# Patient Record
Sex: FEMALE | Race: WHITE | NOT HISPANIC OR LATINO | URBAN - METROPOLITAN AREA
[De-identification: names, ages, dates, MRNs, and addresses within clinical notes are randomized per-mention and may not be internally consistent; named-entity substitution may affect disease eponyms.]

---

## 2022-06-04 ENCOUNTER — TELEPHONE ENCOUNTER (OUTPATIENT)
Dept: URBAN - METROPOLITAN AREA CLINIC 68 | Facility: CLINIC | Age: 62
End: 2022-06-04

## 2022-06-05 ENCOUNTER — TELEPHONE ENCOUNTER (OUTPATIENT)
Dept: URBAN - METROPOLITAN AREA CLINIC 68 | Facility: CLINIC | Age: 62
End: 2022-06-05

## 2022-06-05 RX ORDER — ESOMEPRAZOLE MAGNESIUM 40 MG
CAPSULE,DELAYED RELEASE (ENTERIC COATED) ORAL DAILY
Qty: 30 | Refills: 30 | Status: ACTIVE | COMMUNITY
Start: 2012-09-06

## 2022-06-05 RX ORDER — HYDROXYCHLOROQUINE SULFATE 200 MG/1
PLAQUENIL( 200MG ORAL 40 MG DAILY ) ACTIVE -HX ENTRY TABLET ORAL DAILY
Status: ACTIVE | COMMUNITY
Start: 2012-03-13

## 2022-06-05 RX ORDER — PANTOPRAZOLE SODIUM 40 MG/1
TABLET, DELAYED RELEASE ORAL DAILY
Qty: 90 | Refills: 0 | Status: ACTIVE | COMMUNITY
Start: 2014-02-06

## 2022-06-25 ENCOUNTER — TELEPHONE ENCOUNTER (OUTPATIENT)
Age: 62
End: 2022-06-25

## 2022-06-26 ENCOUNTER — TELEPHONE ENCOUNTER (OUTPATIENT)
Age: 62
End: 2022-06-26

## 2022-06-26 RX ORDER — HYDROXYCHLOROQUINE SULFATE 200 MG/1
PLAQUENIL( 200MG ORAL 40 MG DAILY ) ACTIVE -HX ENTRY TABLET ORAL DAILY
Status: ACTIVE | COMMUNITY
Start: 2012-03-13

## 2022-06-26 RX ORDER — PANTOPRAZOLE 40 MG/1
TABLET, DELAYED RELEASE ORAL DAILY
Qty: 90 | Refills: 0 | Status: ACTIVE | COMMUNITY
Start: 2014-02-06

## 2022-06-26 RX ORDER — ESOMEPRAZOLE MAGNESIUM 40 MG
CAPSULE,DELAYED RELEASE (ENTERIC COATED) ORAL DAILY
Qty: 30 | Refills: 30 | Status: ACTIVE | COMMUNITY
Start: 2012-09-06

## 2024-03-25 ENCOUNTER — NEW PATIENT (OUTPATIENT)
Dept: URBAN - METROPOLITAN AREA CLINIC 53 | Facility: CLINIC | Age: 64
End: 2024-03-25

## 2024-03-25 DIAGNOSIS — Z79.899: ICD-10-CM

## 2024-03-25 DIAGNOSIS — H52.4: ICD-10-CM

## 2024-03-25 DIAGNOSIS — H25.813: ICD-10-CM

## 2024-03-25 DIAGNOSIS — H18.012: ICD-10-CM

## 2024-03-25 DIAGNOSIS — H35.363: ICD-10-CM

## 2024-03-25 PROCEDURE — 92134 CPTRZ OPH DX IMG PST SGM RTA: CPT

## 2024-03-25 PROCEDURE — 92015 DETERMINE REFRACTIVE STATE: CPT

## 2024-03-25 PROCEDURE — 99204 OFFICE O/P NEW MOD 45 MIN: CPT

## 2024-03-25 ASSESSMENT — VISUAL ACUITY
OS_PH: 20/50+2
OU_CC: J1
OD_GLARE: 20/60
OD_SC: 20/400
OS_GLARE: 20/70
OS_CC: 20/200
OD_GLARE: 20/80
OS_GLARE: 20/100
OD_PH: 20/40
OD_CC: 20/80
OS_SC: 20/400

## 2024-03-25 ASSESSMENT — KERATOMETRY
OS_K1POWER_DIOPTERS: 43.25
OD_K2POWER_DIOPTERS: 44.50
OS_K2POWER_DIOPTERS: 44.25
OD_AXISANGLE2_DEGREES: 71
OS_AXISANGLE_DEGREES: 5
OD_K1POWER_DIOPTERS: 43.75
OD_AXISANGLE_DEGREES: 161
OS_AXISANGLE2_DEGREES: 95

## 2024-03-25 ASSESSMENT — TONOMETRY
OD_IOP_MMHG: 19
OS_IOP_MMHG: 20

## 2024-07-15 ENCOUNTER — PRE-OP/H&P (OUTPATIENT)
Dept: URBAN - METROPOLITAN AREA CLINIC 50 | Facility: LOCATION | Age: 64
End: 2024-07-15

## 2024-07-15 DIAGNOSIS — H35.363: ICD-10-CM

## 2024-07-15 DIAGNOSIS — H25.813: ICD-10-CM

## 2024-07-15 PROCEDURE — 92025-3 CORNEAL TOPO, REFUSED

## 2024-07-15 PROCEDURE — 99214 OFFICE O/P EST MOD 30 MIN: CPT

## 2024-07-15 PROCEDURE — 92136 OPHTHALMIC BIOMETRY: CPT

## 2024-07-15 PROCEDURE — 92134 CPTRZ OPH DX IMG PST SGM RTA: CPT

## 2024-07-15 ASSESSMENT — KERATOMETRY
OS_AXISANGLE2_DEGREES: 090
OD_AXISANGLE2_DEGREES: 078
OS_K2POWER_DIOPTERS: 43.37
OS_K1POWER_DIOPTERS: 44.37
OD_K2POWER_DIOPTERS: 43.62
OS_AXISANGLE_DEGREES: 180
OD_K1POWER_DIOPTERS: 44.37
OD_AXISANGLE_DEGREES: 168

## 2024-07-15 ASSESSMENT — TONOMETRY
OD_IOP_MMHG: 18
OS_IOP_MMHG: 18

## 2024-07-15 ASSESSMENT — VISUAL ACUITY
OS_CC: 20/400+1
OD_CC: 20/100+2

## 2024-07-25 ENCOUNTER — SURGERY/PROCEDURE (OUTPATIENT)
Dept: URBAN - METROPOLITAN AREA SURGERY 16 | Facility: SURGERY | Age: 64
End: 2024-07-25

## 2024-07-25 ENCOUNTER — POST-OP (OUTPATIENT)
Dept: URBAN - METROPOLITAN AREA CLINIC 49 | Facility: LOCATION | Age: 64
End: 2024-07-25

## 2024-07-25 DIAGNOSIS — Z98.41: ICD-10-CM

## 2024-07-25 DIAGNOSIS — H25.811: ICD-10-CM

## 2024-07-25 DIAGNOSIS — Z96.1: ICD-10-CM

## 2024-07-25 PROCEDURE — 99024 POSTOP FOLLOW-UP VISIT: CPT

## 2024-07-25 PROCEDURE — 66984 XCAPSL CTRC RMVL W/O ECP: CPT

## 2024-07-25 ASSESSMENT — KERATOMETRY
OD_AXISANGLE2_DEGREES: 078
OD_K2POWER_DIOPTERS: 43.62
OD_AXISANGLE2_DEGREES: 078
OD_K2POWER_DIOPTERS: 43.62
OD_K1POWER_DIOPTERS: 44.37
OS_AXISANGLE_DEGREES: 180
OS_AXISANGLE2_DEGREES: 090
OS_K1POWER_DIOPTERS: 44.37
OS_K2POWER_DIOPTERS: 43.37
OS_AXISANGLE_DEGREES: 180
OD_AXISANGLE_DEGREES: 168
OS_K1POWER_DIOPTERS: 44.37
OD_K1POWER_DIOPTERS: 44.37
OS_AXISANGLE2_DEGREES: 090
OD_AXISANGLE_DEGREES: 168
OS_K2POWER_DIOPTERS: 43.37

## 2024-07-25 ASSESSMENT — TONOMETRY: OD_IOP_MMHG: 28

## 2024-07-30 ENCOUNTER — POST OP/EVAL OF SECOND EYE (OUTPATIENT)
Dept: URBAN - METROPOLITAN AREA CLINIC 53 | Facility: CLINIC | Age: 64
End: 2024-07-30

## 2024-07-30 DIAGNOSIS — Z96.1: ICD-10-CM

## 2024-07-30 DIAGNOSIS — H25.812: ICD-10-CM

## 2024-07-30 DIAGNOSIS — Z98.41: ICD-10-CM

## 2024-07-30 PROCEDURE — 99024 POSTOP FOLLOW-UP VISIT: CPT

## 2024-07-30 ASSESSMENT — VISUAL ACUITY
OD_SC: 20/20-1
OS_SC: 20/400
OS_PH: 20/80-1

## 2024-07-30 ASSESSMENT — TONOMETRY
OD_IOP_MMHG: 14
OS_IOP_MMHG: 16

## 2024-07-30 ASSESSMENT — KERATOMETRY
OS_K2POWER_DIOPTERS: 43.37
OS_AXISANGLE_DEGREES: 180
OD_K1POWER_DIOPTERS: 44.37
OS_K1POWER_DIOPTERS: 44.37
OD_K2POWER_DIOPTERS: 43.62
OD_AXISANGLE_DEGREES: 168
OD_AXISANGLE2_DEGREES: 078
OS_AXISANGLE2_DEGREES: 090

## 2024-08-08 ENCOUNTER — SURGERY/PROCEDURE (OUTPATIENT)
Dept: URBAN - METROPOLITAN AREA SURGERY 16 | Facility: SURGERY | Age: 64
End: 2024-08-08

## 2024-08-08 ENCOUNTER — POST-OP (OUTPATIENT)
Dept: URBAN - METROPOLITAN AREA CLINIC 49 | Facility: LOCATION | Age: 64
End: 2024-08-08

## 2024-08-08 DIAGNOSIS — H25.812: ICD-10-CM

## 2024-08-08 DIAGNOSIS — Z98.42: ICD-10-CM

## 2024-08-08 DIAGNOSIS — Z96.1: ICD-10-CM

## 2024-08-08 PROCEDURE — 66984 XCAPSL CTRC RMVL W/O ECP: CPT | Mod: 79,LT

## 2024-08-08 PROCEDURE — 99024 POSTOP FOLLOW-UP VISIT: CPT

## 2024-08-08 ASSESSMENT — KERATOMETRY
OS_K1POWER_DIOPTERS: 44.37
OD_AXISANGLE2_DEGREES: 078
OS_AXISANGLE_DEGREES: 180
OD_AXISANGLE_DEGREES: 168
OS_AXISANGLE2_DEGREES: 090
OD_K1POWER_DIOPTERS: 44.37
OS_K2POWER_DIOPTERS: 43.37
OD_K2POWER_DIOPTERS: 43.62
OS_K2POWER_DIOPTERS: 43.37
OS_AXISANGLE_DEGREES: 180
OS_K1POWER_DIOPTERS: 44.37
OD_AXISANGLE2_DEGREES: 078
OD_AXISANGLE_DEGREES: 168
OD_K2POWER_DIOPTERS: 43.62
OS_AXISANGLE2_DEGREES: 090
OD_K1POWER_DIOPTERS: 44.37

## 2024-08-08 ASSESSMENT — TONOMETRY: OS_IOP_MMHG: 18

## 2024-08-08 ASSESSMENT — VISUAL ACUITY: OS_SC: 20/150

## 2024-08-12 ENCOUNTER — POST-OP (OUTPATIENT)
Dept: URBAN - METROPOLITAN AREA CLINIC 50 | Facility: LOCATION | Age: 64
End: 2024-08-12

## 2024-08-12 DIAGNOSIS — Z96.1: ICD-10-CM

## 2024-08-12 DIAGNOSIS — Z98.42: ICD-10-CM

## 2024-08-12 PROCEDURE — 99024 POSTOP FOLLOW-UP VISIT: CPT

## 2024-08-12 ASSESSMENT — KERATOMETRY
OS_K1POWER_DIOPTERS: 44.37
OD_K1POWER_DIOPTERS: 44.37
OD_AXISANGLE2_DEGREES: 078
OS_AXISANGLE2_DEGREES: 090
OS_AXISANGLE_DEGREES: 180
OD_K2POWER_DIOPTERS: 43.62
OS_K2POWER_DIOPTERS: 43.37
OD_AXISANGLE_DEGREES: 168

## 2024-08-12 ASSESSMENT — TONOMETRY
OD_IOP_MMHG: 16
OS_IOP_MMHG: 16

## 2024-08-12 ASSESSMENT — VISUAL ACUITY
OS_SC: 20/40+2
OS_PH: 20/25

## 2024-09-09 ENCOUNTER — POST-OP (OUTPATIENT)
Dept: URBAN - METROPOLITAN AREA CLINIC 50 | Facility: LOCATION | Age: 64
End: 2024-09-09

## 2024-09-09 DIAGNOSIS — Z98.42: ICD-10-CM

## 2024-09-09 DIAGNOSIS — H52.4: ICD-10-CM

## 2024-09-09 DIAGNOSIS — Z98.41: ICD-10-CM

## 2024-09-09 DIAGNOSIS — Z96.1: ICD-10-CM

## 2024-09-09 DIAGNOSIS — H20.023: ICD-10-CM

## 2024-09-09 DIAGNOSIS — H35.363: ICD-10-CM

## 2024-10-29 ENCOUNTER — APPOINTMENT (RX ONLY)
Dept: URBAN - METROPOLITAN AREA CLINIC 86 | Facility: CLINIC | Age: 64
Setting detail: DERMATOLOGY
End: 2024-10-29

## 2024-10-29 DIAGNOSIS — D22 MELANOCYTIC NEVI: ICD-10-CM

## 2024-10-29 DIAGNOSIS — Z12.83 ENCOUNTER FOR SCREENING FOR MALIGNANT NEOPLASM OF SKIN: ICD-10-CM

## 2024-10-29 DIAGNOSIS — L57.0 ACTINIC KERATOSIS: ICD-10-CM

## 2024-10-29 DIAGNOSIS — L81.4 OTHER MELANIN HYPERPIGMENTATION: ICD-10-CM

## 2024-10-29 DIAGNOSIS — L82.1 OTHER SEBORRHEIC KERATOSIS: ICD-10-CM

## 2024-10-29 PROBLEM — D22.5 MELANOCYTIC NEVI OF TRUNK: Status: ACTIVE | Noted: 2024-10-29

## 2024-10-29 PROCEDURE — 17000 DESTRUCT PREMALG LESION: CPT

## 2024-10-29 PROCEDURE — ? TREATMENT REGIMEN

## 2024-10-29 PROCEDURE — ? FULL BODY SKIN EXAM

## 2024-10-29 PROCEDURE — ? COUNSELING

## 2024-10-29 PROCEDURE — 99203 OFFICE O/P NEW LOW 30 MIN: CPT | Mod: 25

## 2024-10-29 PROCEDURE — 17003 DESTRUCT PREMALG LES 2-14: CPT

## 2024-10-29 PROCEDURE — ? LIQUID NITROGEN

## 2024-10-29 ASSESSMENT — LOCATION DETAILED DESCRIPTION DERM
LOCATION DETAILED: LEFT LATERAL SUPERIOR CHEST
LOCATION DETAILED: RIGHT DISTAL DORSAL FOREARM
LOCATION DETAILED: RIGHT PROXIMAL DORSAL FOREARM
LOCATION DETAILED: LEFT MEDIAL ZYGOMA
LOCATION DETAILED: RIGHT MID-UPPER BACK
LOCATION DETAILED: LEFT MID-UPPER BACK
LOCATION DETAILED: LEFT PROXIMAL DORSAL FOREARM

## 2024-10-29 ASSESSMENT — LOCATION SIMPLE DESCRIPTION DERM
LOCATION SIMPLE: LEFT UPPER BACK
LOCATION SIMPLE: CHEST
LOCATION SIMPLE: LEFT ZYGOMA
LOCATION SIMPLE: RIGHT UPPER BACK
LOCATION SIMPLE: LEFT FOREARM
LOCATION SIMPLE: RIGHT FOREARM

## 2024-10-29 ASSESSMENT — LOCATION ZONE DERM
LOCATION ZONE: ARM
LOCATION ZONE: TRUNK
LOCATION ZONE: FACE

## 2024-10-29 NOTE — PROCEDURE: LIQUID NITROGEN
Detail Level: Detailed
Show Applicator Variable?: Yes
Number Of Freeze-Thaw Cycles: 1 freeze-thaw cycle
Render Post-Care Instructions In Note?: no
Consent: The patient's consent was obtained including but not limited to risks of crusting, scabbing, blistering, scarring, darker or lighter pigmentary change, recurrence, incomplete removal and infection.
Post-Care Instructions: I reviewed with the patient in detail post-care instructions. Patient is to wear sunprotection, and avoid picking at any of the treated lesions. Pt may apply Vaseline to crusted or scabbing areas.
Duration Of Freeze Thaw-Cycle (Seconds): 0

## 2025-07-10 ENCOUNTER — APPOINTMENT (OUTPATIENT)
Dept: URBAN - METROPOLITAN AREA CLINIC 86 | Facility: CLINIC | Age: 65
Setting detail: DERMATOLOGY
End: 2025-07-10

## 2025-07-10 DIAGNOSIS — D18.0 HEMANGIOMA: ICD-10-CM | Status: STABLE

## 2025-07-10 DIAGNOSIS — D22 MELANOCYTIC NEVI: ICD-10-CM | Status: STABLE

## 2025-07-10 DIAGNOSIS — L81.4 OTHER MELANIN HYPERPIGMENTATION: ICD-10-CM | Status: STABLE

## 2025-07-10 DIAGNOSIS — L57.8 OTHER SKIN CHANGES DUE TO CHRONIC EXPOSURE TO NONIONIZING RADIATION: ICD-10-CM | Status: STABLE

## 2025-07-10 DIAGNOSIS — L82.1 OTHER SEBORRHEIC KERATOSIS: ICD-10-CM | Status: STABLE

## 2025-07-10 PROBLEM — D18.01 HEMANGIOMA OF SKIN AND SUBCUTANEOUS TISSUE: Status: ACTIVE | Noted: 2025-07-10

## 2025-07-10 PROBLEM — D22.5 MELANOCYTIC NEVI OF TRUNK: Status: ACTIVE | Noted: 2025-07-10

## 2025-07-10 PROCEDURE — ? TREATMENT REGIMEN

## 2025-07-10 PROCEDURE — ? COUNSELING

## 2025-07-10 PROCEDURE — ? FULL BODY SKIN EXAM

## 2025-07-10 ASSESSMENT — LOCATION DETAILED DESCRIPTION DERM
LOCATION DETAILED: RIGHT ANTERIOR DISTAL THIGH
LOCATION DETAILED: LEFT MEDIAL SUPERIOR CHEST
LOCATION DETAILED: LEFT ANTERIOR DISTAL THIGH
LOCATION DETAILED: INFERIOR MID FOREHEAD
LOCATION DETAILED: RIGHT VENTRAL PROXIMAL FOREARM
LOCATION DETAILED: MIDDLE STERNUM
LOCATION DETAILED: LEFT VENTRAL PROXIMAL FOREARM

## 2025-07-10 ASSESSMENT — LOCATION SIMPLE DESCRIPTION DERM
LOCATION SIMPLE: LEFT FOREARM
LOCATION SIMPLE: INFERIOR FOREHEAD
LOCATION SIMPLE: RIGHT THIGH
LOCATION SIMPLE: RIGHT FOREARM
LOCATION SIMPLE: CHEST
LOCATION SIMPLE: LEFT THIGH

## 2025-07-10 ASSESSMENT — LOCATION ZONE DERM
LOCATION ZONE: FACE
LOCATION ZONE: TRUNK
LOCATION ZONE: LEG
LOCATION ZONE: ARM